# Patient Record
Sex: MALE | Race: WHITE
[De-identification: names, ages, dates, MRNs, and addresses within clinical notes are randomized per-mention and may not be internally consistent; named-entity substitution may affect disease eponyms.]

---

## 2018-02-01 ENCOUNTER — HOSPITAL ENCOUNTER (EMERGENCY)
Dept: HOSPITAL 9 - MADERS | Age: 78
Discharge: HOME | End: 2018-02-01
Payer: MEDICARE

## 2018-02-01 DIAGNOSIS — Z79.899: ICD-10-CM

## 2018-02-01 DIAGNOSIS — Z79.891: ICD-10-CM

## 2018-02-01 DIAGNOSIS — E86.0: ICD-10-CM

## 2018-02-01 DIAGNOSIS — Z79.82: ICD-10-CM

## 2018-02-01 DIAGNOSIS — I10: ICD-10-CM

## 2018-02-01 DIAGNOSIS — K52.9: Primary | ICD-10-CM

## 2018-02-01 DIAGNOSIS — Z79.02: ICD-10-CM

## 2018-02-01 LAB
ALBUMIN SERPL BCG-MCNC: 4.1 G/DL (ref 3.4–4.8)
ALP SERPL-CCNC: 89 U/L (ref 40–150)
ALT SERPL W P-5'-P-CCNC: 21 U/L (ref 8–55)
ANION GAP SERPL CALC-SCNC: 17 MMOL/L (ref 10–20)
AST SERPL-CCNC: 30 U/L (ref 5–34)
BASOPHILS # BLD AUTO: 0 THOU/UL (ref 0–0.2)
BASOPHILS NFR BLD AUTO: 0.5 % (ref 0–1)
BILIRUB SERPL-MCNC: 0.5 MG/DL (ref 0.2–1.2)
BUN SERPL-MCNC: 18 MG/DL (ref 8.4–25.7)
CALCIUM SERPL-MCNC: 9.4 MG/DL (ref 7.8–10.44)
CHLORIDE SERPL-SCNC: 99 MMOL/L (ref 98–107)
CO2 SERPL-SCNC: 26 MMOL/L (ref 23–31)
CREAT CL PREDICTED SERPL C-G-VRATE: 0 ML/MIN (ref 70–130)
EOSINOPHIL # BLD AUTO: 0.1 THOU/UL (ref 0–0.7)
EOSINOPHIL NFR BLD AUTO: 2 % (ref 0–10)
GLOBULIN SER CALC-MCNC: 3.3 G/DL (ref 2.4–3.5)
GLUCOSE SERPL-MCNC: 109 MG/DL (ref 83–110)
HGB BLD-MCNC: 14.9 G/DL (ref 14–18)
LIPASE SERPL-CCNC: 41 U/L (ref 8–78)
LYMPHOCYTES # BLD AUTO: 1.7 THOU/UL (ref 1.2–3.4)
LYMPHOCYTES NFR BLD AUTO: 23.8 % (ref 21–51)
MCH RBC QN AUTO: 28.7 PG (ref 27–31)
MCV RBC AUTO: 88.3 FL (ref 80–94)
MONOCYTES # BLD AUTO: 0.9 THOU/UL (ref 0.11–0.59)
MONOCYTES NFR BLD AUTO: 12.8 % (ref 0–10)
NEUTROPHILS # BLD AUTO: 4.4 THOU/UL (ref 1.4–6.5)
NEUTROPHILS NFR BLD AUTO: 60.9 % (ref 42–75)
PLATELET # BLD AUTO: 224 THOU/UL (ref 130–400)
POTASSIUM SERPL-SCNC: 3.4 MMOL/L (ref 3.5–5.1)
RBC # BLD AUTO: 5.2 MILL/UL (ref 4.7–6.1)
SODIUM SERPL-SCNC: 139 MMOL/L (ref 136–145)
WBC # BLD AUTO: 7.2 THOU/UL (ref 4.8–10.8)

## 2018-02-01 PROCEDURE — 96374 THER/PROPH/DIAG INJ IV PUSH: CPT

## 2018-02-01 PROCEDURE — 80053 COMPREHEN METABOLIC PANEL: CPT

## 2018-02-01 PROCEDURE — 71046 X-RAY EXAM CHEST 2 VIEWS: CPT

## 2018-02-01 PROCEDURE — 96361 HYDRATE IV INFUSION ADD-ON: CPT

## 2018-02-01 PROCEDURE — 85025 COMPLETE CBC W/AUTO DIFF WBC: CPT

## 2018-02-01 PROCEDURE — 83690 ASSAY OF LIPASE: CPT

## 2018-02-01 NOTE — RAD
CHEST TWO VIEWS:

 

HISTORY:

Cough.

 

COMPARISON:

None.

 

FINDINGS:

There are numerous opacities projecting over the left mid lung with some interstitial thickening.  Mu
ltiple old bilateral rib fractures.  Mild area of density projects over the left lung apex, suspect s
mall left effusion.

 

IMPRESSION:

Multifocal air space opacities in the left lung with some nodularity as well as some interstitial thi
ckening and a small left effusion.  The possibility exists for calcified pleural plaques.  A follow-u
p non-emergent CT of the chest may be beneficial.

 

POS: OFF

## 2021-02-26 ENCOUNTER — HOSPITAL ENCOUNTER (OUTPATIENT)
Dept: HOSPITAL 92 - RAD | Age: 81
Discharge: HOME | End: 2021-02-26
Attending: NEUROLOGICAL SURGERY
Payer: OTHER GOVERNMENT

## 2021-02-26 VITALS — DIASTOLIC BLOOD PRESSURE: 73 MMHG | SYSTOLIC BLOOD PRESSURE: 138 MMHG | TEMPERATURE: 98.4 F

## 2021-02-26 VITALS — BODY MASS INDEX: 28.3 KG/M2

## 2021-02-26 DIAGNOSIS — Z79.82: ICD-10-CM

## 2021-02-26 DIAGNOSIS — Z95.5: ICD-10-CM

## 2021-02-26 DIAGNOSIS — G89.29: ICD-10-CM

## 2021-02-26 DIAGNOSIS — M19.90: ICD-10-CM

## 2021-02-26 DIAGNOSIS — E78.5: ICD-10-CM

## 2021-02-26 DIAGNOSIS — J45.909: ICD-10-CM

## 2021-02-26 DIAGNOSIS — I65.23: ICD-10-CM

## 2021-02-26 DIAGNOSIS — M48.02: ICD-10-CM

## 2021-02-26 DIAGNOSIS — Z87.891: ICD-10-CM

## 2021-02-26 DIAGNOSIS — Z79.1: ICD-10-CM

## 2021-02-26 DIAGNOSIS — Z79.899: ICD-10-CM

## 2021-02-26 DIAGNOSIS — K21.9: ICD-10-CM

## 2021-02-26 DIAGNOSIS — M47.22: Primary | ICD-10-CM

## 2021-02-26 DIAGNOSIS — I10: ICD-10-CM

## 2021-02-26 DIAGNOSIS — M10.9: ICD-10-CM

## 2021-02-26 PROCEDURE — 72126 CT NECK SPINE W/DYE: CPT

## 2021-02-26 PROCEDURE — G0008 ADMIN INFLUENZA VIRUS VAC: HCPCS

## 2021-02-26 PROCEDURE — 62302 MYELOGRAPHY LUMBAR INJECTION: CPT

## 2021-02-26 PROCEDURE — 90471 IMMUNIZATION ADMIN: CPT

## 2021-02-26 PROCEDURE — 90662 IIV NO PRSV INCREASED AG IM: CPT

## 2021-02-26 PROCEDURE — B02B1ZZ COMPUTERIZED TOMOGRAPHY (CT SCAN) OF SPINAL CORD USING LOW OSMOLAR CONTRAST: ICD-10-PCS | Performed by: RADIOLOGY

## 2021-02-26 NOTE — CT
CERVICAL SPINE CT MYELOGRAM:



DATE:  2/26/2021.



HISTORY: 

Radiculopathy, cervical spondylosis, pain.



TECHNIQUE: 

Following the intrathecal administration of contrast media, axial CT imaging at 2.5 mm intervals from
 the skull base through the lung apices. Coronal and sagittal reformatted imaging obtained.



FINDINGS: 

The imaged lung apices demonstrate no acute findings.



There is moderate degenerative change at the atlantoaxial interspace with posterior pseudopannus form
ation.



There is prominent degenerative change at the C1-2 articulation on the left with subcortical cystic c
hange involving the inferior aspect of the lateral mass of C1 on the left and the left aspect of

the C2 vertebral body.



C2-3: There is mild anterolisthesis at C2-3 measuring in the 4 mm range. Bilateral facet hypertrophy 
is noted, especially on the left. There is an anterior left-sided defect in the contrast column

consistent with a space occupying mass which is inseparable from the anterior exiting nerve root. It 
is also inseparable from the adjacent facet joint which demonstrates prominent hypertrophic change

and osteophyte formation. This lesion measures 6 mm in AP dimension and is just inferior and medial t
o the left neural foramen. This is best seen on axial image 24. No significant central canal

stenosis. Facet and uncovertebral osteophyte formation leads to mild/moderate left neural foraminal s
tenosis. No right neural foraminal stenosis.



C3-4: There is disc space narrowing with degenerative endplate change. There is bilateral facet and u
ncovertebral osteophyte formation with prominent anterior osteophyte as well. No significant

central canal stenosis. Posterior disc osteophyte complex present with partial effacement of the vent
ral thecal sac. Moderate/severe right neural foraminal stenosis and mild left neural foraminal

stenosis



C4-5: There is disc space narrowing with degenerative endplate change, anterior osteophyte formation,
 and a disc osteophyte complex partially effacing the ventral thecal sac and leading to a

mild/moderate degree of central canal stenosis. Bilateral facet and uncovertebral osteophyte formatio
n, right greater than left, with moderate/severe bilateral neural foraminal stenosis.



C5-6: There is degenerative endplate change with disc space narrowing, anterior osteophyte formation,
 and a disc osteophyte complex. Partial effacement of the ventral thecal sac with mild central

canal stenosis. Moderate/severe bilateral neural foraminal stenosis, right greater than left, on the 
basis of bilateral facet and uncovertebral osteophyte formation.



C6-7: Facet and uncovertebral osteophyte formation noted bilaterally with mild right and moderate/sev
ere left neural foraminal stenosis. Mild central canal stenosis.



C7-T1: Facet hypertrophy on the right leads to moderate right neural foraminal stenosis. No central c
anal or left neural foraminal stenosis.



No prevertebral soft tissue swelling. No lytic or blastic bone lesion. No acute fracture or dislocati
on.



There is atherosclerotic calcification involving the distal right CCA and the bilateral proximal ICA.




IMPRESSION: 

6 mm abnormality involving the anterior lateral aspect of the central canal on the left at C2-3. This
 may represent a neurogenic tumor, such as a schwannoma. Given the immediately adjacent markedly

hypertrophic facet joint it is conceivable that this could be related to hypertrophic synovium and or
 a small synovial cyst. Short-term follow-up imaging is suggested. If the patient's stimulating

device is compatible with MRI, contrast-enhanced MRI would be beneficial. Otherwise, follow-up CT sug
gested.



Multilevel cervical spine degenerative change.  Study evaluated in consultation with Dr. Canales.



Results were evaluated 6 mm lesion in the anterior lateral aspect of the right.



Transcribed Date/Time: 2/26/2021 11:59 AM



Reported By: Roni Anglin 

Electronically Signed:  2/26/2021 1:57 PM

## 2021-02-26 NOTE — RAD
Cervical myelogram



HISTORY: Neck pain with radiculopathy.



FINDINGS: After explaining this procedure and answering all questions, the posterior aspect of lower 
back was prepped and draped in usual sterile fashion.



Sterile technique, buffered local anesthesia, fluoroscopic guidance, and a posterior L1-2 approach we
re used to carefully advance the tip of a 22-gauge spinal needle to the thecal sac. Approximately 8

cc of Isovue 300 M contrast was carefully instilled into the thecal sac under fluoroscopic control. N
eedle was removed and spot images obtained, showing multilevel central canal stenoses of lumbar

spine.



Patient was repositioned to move contrast to the cervical spine. Patient tolerated the procedure well
 and was eventually dismissed in good condition.



  



IMPRESSION :

Technically successful cervical myelogram. CT is pending.



Reported By: MERY Maxwell 

Electronically Signed:  2/26/2021 3:27 PM

## 2022-11-17 ENCOUNTER — HOSPITAL ENCOUNTER (OUTPATIENT)
Dept: HOSPITAL 92 - SDC | Age: 82
Discharge: HOME | End: 2022-11-17
Attending: NEUROLOGICAL SURGERY
Payer: OTHER GOVERNMENT

## 2022-11-17 VITALS — BODY MASS INDEX: 21.2 KG/M2

## 2022-11-17 DIAGNOSIS — Z96.82: ICD-10-CM

## 2022-11-17 DIAGNOSIS — M48.02: ICD-10-CM

## 2022-11-17 DIAGNOSIS — F17.200: ICD-10-CM

## 2022-11-17 DIAGNOSIS — G56.21: Primary | ICD-10-CM

## 2022-11-17 DIAGNOSIS — Z79.899: ICD-10-CM

## 2022-11-17 DIAGNOSIS — M50.11: ICD-10-CM

## 2022-11-17 DIAGNOSIS — M10.9: ICD-10-CM

## 2022-11-17 DIAGNOSIS — Z95.5: ICD-10-CM

## 2022-11-17 DIAGNOSIS — Z79.82: ICD-10-CM

## 2022-11-17 DIAGNOSIS — Z79.1: ICD-10-CM

## 2022-11-17 DIAGNOSIS — M19.90: ICD-10-CM

## 2022-11-17 DIAGNOSIS — G89.29: ICD-10-CM

## 2022-11-17 DIAGNOSIS — I10: ICD-10-CM

## 2022-11-17 DIAGNOSIS — E78.00: ICD-10-CM

## 2022-11-17 DIAGNOSIS — G56.01: ICD-10-CM

## 2022-11-17 LAB
ANION GAP SERPL CALC-SCNC: 12 MMOL/L (ref 10–20)
APTT PPP: 33.7 SEC (ref 22.9–36.1)
BASOPHILS # BLD AUTO: 0 THOU/UL (ref 0–0.2)
BASOPHILS NFR BLD AUTO: 0.3 % (ref 0–1)
BUN SERPL-MCNC: 18 MG/DL (ref 8.4–25.7)
CALCIUM SERPL-MCNC: 8.5 MG/DL (ref 7.8–10.44)
CHLORIDE SERPL-SCNC: 105 MMOL/L (ref 98–107)
CO2 SERPL-SCNC: 25 MMOL/L (ref 23–31)
CREAT CL PREDICTED SERPL C-G-VRATE: 41 ML/MIN (ref 70–130)
EOSINOPHIL # BLD AUTO: 0.2 THOU/UL (ref 0–0.7)
EOSINOPHIL NFR BLD AUTO: 5.5 % (ref 0–10)
GLUCOSE SERPL-MCNC: 91 MG/DL (ref 83–110)
HGB BLD-MCNC: 13 G/DL (ref 14–18)
INR PPP: 1
LYMPHOCYTES # BLD: 1.5 THOU/UL (ref 1.2–3.4)
LYMPHOCYTES NFR BLD AUTO: 35.6 % (ref 21–51)
MCH RBC QN AUTO: 31.7 PG (ref 27–31)
MCV RBC AUTO: 93.9 FL (ref 78–98)
MONOCYTES # BLD AUTO: 0.4 THOU/UL (ref 0.11–0.59)
MONOCYTES NFR BLD AUTO: 10 % (ref 0–10)
NEUTROPHILS # BLD AUTO: 2 THOU/UL (ref 1.4–6.5)
NEUTROPHILS NFR BLD AUTO: 48.6 % (ref 42–75)
PLATELET # BLD AUTO: 172 10X3/UL (ref 130–400)
POTASSIUM SERPL-SCNC: 3.5 MMOL/L (ref 3.5–5.1)
PROTHROMBIN TIME: 13.8 SEC (ref 12–14.7)
RBC # BLD AUTO: 4.12 MILL/UL (ref 4.7–6.1)
SODIUM SERPL-SCNC: 138 MMOL/L (ref 136–145)
WBC # BLD AUTO: 4.2 10X3/UL (ref 4.8–10.8)

## 2022-11-17 PROCEDURE — 85730 THROMBOPLASTIN TIME PARTIAL: CPT

## 2022-11-17 PROCEDURE — 93010 ELECTROCARDIOGRAM REPORT: CPT

## 2022-11-17 PROCEDURE — 01N40ZZ RELEASE ULNAR NERVE, OPEN APPROACH: ICD-10-PCS | Performed by: NEUROLOGICAL SURGERY

## 2022-11-17 PROCEDURE — 85025 COMPLETE CBC W/AUTO DIFF WBC: CPT

## 2022-11-17 PROCEDURE — 93005 ELECTROCARDIOGRAM TRACING: CPT

## 2022-11-17 PROCEDURE — 01N50ZZ RELEASE MEDIAN NERVE, OPEN APPROACH: ICD-10-PCS | Performed by: NEUROLOGICAL SURGERY

## 2022-11-17 PROCEDURE — 36415 COLL VENOUS BLD VENIPUNCTURE: CPT

## 2022-11-17 PROCEDURE — 80048 BASIC METABOLIC PNL TOTAL CA: CPT

## 2022-11-17 PROCEDURE — 85610 PROTHROMBIN TIME: CPT
